# Patient Record
Sex: FEMALE | Race: OTHER | NOT HISPANIC OR LATINO | URBAN - METROPOLITAN AREA
[De-identification: names, ages, dates, MRNs, and addresses within clinical notes are randomized per-mention and may not be internally consistent; named-entity substitution may affect disease eponyms.]

---

## 2021-03-29 ENCOUNTER — OFFICE VISIT (OUTPATIENT)
Dept: URBAN - METROPOLITAN AREA CLINIC 109 | Facility: CLINIC | Age: 26
End: 2021-03-29

## 2021-11-18 ENCOUNTER — OFFICE VISIT (OUTPATIENT)
Dept: URBAN - METROPOLITAN AREA CLINIC 118 | Facility: CLINIC | Age: 26
End: 2021-11-18
Payer: MEDICAID

## 2021-11-18 DIAGNOSIS — K60.2 ANAL FISSURE: ICD-10-CM

## 2021-11-18 PROCEDURE — 99244 OFF/OP CNSLTJ NEW/EST MOD 40: CPT | Performed by: INTERNAL MEDICINE

## 2021-11-18 PROCEDURE — 99204 OFFICE O/P NEW MOD 45 MIN: CPT | Performed by: INTERNAL MEDICINE

## 2021-11-18 NOTE — HPI-TODAY'S VISIT:
The patient is referred by  for anal pain and mild bleeding.  Note was sent.  This pain was acute onset after delivery of her first child approximately 6 weeks ago.  There was a mild tearing of the vaginal cuff, but there was no significant episiotomy or rectal tearing.  She does have a history of mild hemorrhoids but her primary complaint is pain in the specific area of the anal canal with bowel movements, and a trace of blood.  She does have a history of mild constipation but this was worsened after delivery.  She has not been using any laxative therapy.  She has tried topical therapy with Tucks pads as well as Preparation H with no relief.  She has no history of an anal fissure.  There is been no recent anorectal trauma.

## 2021-11-18 NOTE — PHYSICAL EXAM GASTROINTESTINAL
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

## 2022-01-12 ENCOUNTER — OFFICE VISIT (OUTPATIENT)
Dept: URBAN - METROPOLITAN AREA CLINIC 118 | Facility: CLINIC | Age: 27
End: 2022-01-12
Payer: MEDICAID

## 2022-01-12 VITALS
WEIGHT: 160.8 LBS | HEART RATE: 56 BPM | BODY MASS INDEX: 26.79 KG/M2 | TEMPERATURE: 98.4 F | SYSTOLIC BLOOD PRESSURE: 112 MMHG | DIASTOLIC BLOOD PRESSURE: 72 MMHG | HEIGHT: 65 IN

## 2022-01-12 DIAGNOSIS — K60.2 ANAL FISSURE: ICD-10-CM

## 2022-01-12 DIAGNOSIS — K59.04 CHRONIC IDIOPATHIC CONSTIPATION: ICD-10-CM

## 2022-01-12 PROCEDURE — 99213 OFFICE O/P EST LOW 20 MIN: CPT | Performed by: INTERNAL MEDICINE

## 2022-01-12 NOTE — HPI-TODAY'S VISIT:
The patient is following up in clinic for her anal fissure.  She tried nitroglycerin cream and did not feel this helped much.  However she also has chronic constipation and with daily use of MiraLAX was able to have a bowel movement at least every 2 to 3 days.  This improved the symptoms of anal burning and there is no significant itching or rectal bleeding.  She also tried daily fiber but does not feel this helped as much as the MiraLAX did.  Currently she has no significant anal pain nor bleeding.

## 2022-01-14 PROBLEM — 82934008: Status: ACTIVE | Noted: 2022-01-14

## 2022-09-22 ENCOUNTER — OFFICE VISIT (OUTPATIENT)
Dept: URBAN - METROPOLITAN AREA CLINIC 52 | Facility: CLINIC | Age: 27
End: 2022-09-22
Payer: MEDICAID

## 2022-09-22 VITALS
BODY MASS INDEX: 24.12 KG/M2 | WEIGHT: 144.8 LBS | HEART RATE: 69 BPM | HEIGHT: 65 IN | DIASTOLIC BLOOD PRESSURE: 70 MMHG | OXYGEN SATURATION: 98 % | SYSTOLIC BLOOD PRESSURE: 114 MMHG | TEMPERATURE: 96.8 F

## 2022-09-22 DIAGNOSIS — K64.4 EXTERNAL HEMORRHOID: ICD-10-CM

## 2022-09-22 DIAGNOSIS — R19.5 HARD STOOL: ICD-10-CM

## 2022-09-22 DIAGNOSIS — K60.2 ANAL FISSURE: ICD-10-CM

## 2022-09-22 PROCEDURE — 99213 OFFICE O/P EST LOW 20 MIN: CPT | Performed by: PHYSICIAN ASSISTANT

## 2022-09-22 RX ORDER — HYDROCORTISONE 25 MG/G
1 APPLICATION CREAM TOPICAL
Qty: 1 | Refills: 1 | OUTPATIENT
Start: 2022-09-22 | End: 2022-10-06

## 2022-09-22 RX ORDER — NIFEDIPINE
PEA SIZE AMOUNT POWDER (GRAM) MISCELLANEOUS
Qty: 15 GRAM | Refills: 1 | OUTPATIENT
Start: 2022-09-22 | End: 2022-11-20

## 2022-09-22 NOTE — PHYSICAL EXAM GASTROINTESTINAL
Abdomen , soft, nontender, nondistended , no guarding or rigidity , no masses palpable , normal bowel sounds , Liver and Spleen,  no hepatosplenomegaly , liver nontender Rectal: external hemorrhoid, non-bleeding, tender to touch; anal fissure not easily appreciated

## 2022-09-22 NOTE — HPI-TODAY'S VISIT:
This is a 27 y.o. female with h/o anal fissure and chronic constipation. She presents to office c/o hard stools, hemorrhoids and blood in stool. Patient states her anal fissue was doing well for 6 months because her stools were soft. Recently, patient reports harder stools, even though she drinks 1/2 gallon water a day, eats a high fiber diet, and takes Miralax daily. She has failed topical Nitroglycerin in the past for anal fissure.

## 2022-10-20 ENCOUNTER — OFFICE VISIT (OUTPATIENT)
Dept: URBAN - METROPOLITAN AREA CLINIC 52 | Facility: CLINIC | Age: 27
End: 2022-10-20

## 2022-10-20 RX ORDER — NIFEDIPINE
PEA SIZE AMOUNT POWDER (GRAM) MISCELLANEOUS
Qty: 15 GRAM | Refills: 1 | Status: ACTIVE | COMMUNITY
Start: 2022-09-22 | End: 2022-11-20

## 2022-11-03 ENCOUNTER — WEB ENCOUNTER (OUTPATIENT)
Dept: URBAN - METROPOLITAN AREA CLINIC 52 | Facility: CLINIC | Age: 27
End: 2022-11-03

## 2022-11-07 ENCOUNTER — OFFICE VISIT (OUTPATIENT)
Dept: URBAN - METROPOLITAN AREA CLINIC 52 | Facility: CLINIC | Age: 27
End: 2022-11-07
Payer: MEDICAID

## 2022-11-07 VITALS
DIASTOLIC BLOOD PRESSURE: 62 MMHG | WEIGHT: 145 LBS | HEART RATE: 82 BPM | HEIGHT: 65 IN | BODY MASS INDEX: 24.16 KG/M2 | SYSTOLIC BLOOD PRESSURE: 90 MMHG | TEMPERATURE: 97.2 F

## 2022-11-07 DIAGNOSIS — K60.2 ANAL FISSURE: ICD-10-CM

## 2022-11-07 DIAGNOSIS — K59.00 CONSTIPATION, UNSPECIFIED CONSTIPATION TYPE: ICD-10-CM

## 2022-11-07 DIAGNOSIS — K64.9 HEMORRHOIDS, UNSPECIFIED HEMORRHOID TYPE: ICD-10-CM

## 2022-11-07 PROBLEM — 14760008: Status: ACTIVE | Noted: 2022-11-07

## 2022-11-07 PROCEDURE — 99214 OFFICE O/P EST MOD 30 MIN: CPT | Performed by: PHYSICIAN ASSISTANT

## 2022-11-07 RX ORDER — NIFEDIPINE
PEA SIZE AMOUNT POWDER (GRAM) MISCELLANEOUS
Qty: 15 GRAM | Refills: 1 | Status: ON HOLD | COMMUNITY
Start: 2022-09-22 | End: 2022-11-20

## 2022-11-07 NOTE — HPI-TODAY'S VISIT:
This is a 27 y.o. female with h/o anal fissure and chronic constipation. She presents to office c/o hard stools, hemorrhoids and blood in stool. Patient states her anal fissue was doing well for 6 months because her stools were soft. Recently, patient reports harder stools, even though she drinks 1/2 gallon water a day, eats a high fiber diet, and takes Miralax daily. She has failed topical Nitroglycerin in the past for anal fissure. She was started on Nifedipine oinment on her last office visit. She has been compliant with Nifedipine ointment, but says she still has hard stools and rectal bleeding. Unsure if rectal bleeding is coming from anal fissure or hemorrhoids.

## 2022-11-07 NOTE — PHYSICAL EXAM GASTROINTESTINAL
Abdomen , soft, nontender, nondistended , no guarding or rigidity , no masses palpable , normal bowel sounds , Liver and Spleen,  no hepatosplenomegaly , liver nontender Rectal: external hemorrhoidal skin tag, non-bleeding, nontender; anal fissure at 12 o' clock position, slightly tender, healing well

## 2022-12-01 ENCOUNTER — DASHBOARD ENCOUNTERS (OUTPATIENT)
Age: 27
End: 2022-12-01

## 2022-12-05 ENCOUNTER — OFFICE VISIT (OUTPATIENT)
Dept: URBAN - METROPOLITAN AREA CLINIC 52 | Facility: CLINIC | Age: 27
End: 2022-12-05

## 2024-12-30 ENCOUNTER — TELEPHONE ENCOUNTER (OUTPATIENT)
Dept: URBAN - METROPOLITAN AREA CLINIC 118 | Facility: CLINIC | Age: 29
End: 2024-12-30

## 2024-12-30 ENCOUNTER — OFFICE VISIT (OUTPATIENT)
Dept: URBAN - METROPOLITAN AREA CLINIC 118 | Facility: CLINIC | Age: 29
End: 2024-12-30
Payer: COMMERCIAL

## 2024-12-30 VITALS
HEART RATE: 73 BPM | TEMPERATURE: 97.5 F | SYSTOLIC BLOOD PRESSURE: 131 MMHG | DIASTOLIC BLOOD PRESSURE: 89 MMHG | WEIGHT: 161.8 LBS | HEIGHT: 65 IN | BODY MASS INDEX: 26.96 KG/M2

## 2024-12-30 DIAGNOSIS — K64.4 HEMORRHOIDS, EXTERNAL: ICD-10-CM

## 2024-12-30 DIAGNOSIS — K59.04 CHRONIC IDIOPATHIC CONSTIPATION: ICD-10-CM

## 2024-12-30 DIAGNOSIS — K60.2 ANAL FISSURE: ICD-10-CM

## 2024-12-30 DIAGNOSIS — K64.8 INTERNAL HEMORRHOIDS: ICD-10-CM

## 2024-12-30 PROBLEM — 23913003: Status: ACTIVE | Noted: 2024-12-30

## 2024-12-30 PROCEDURE — 99214 OFFICE O/P EST MOD 30 MIN: CPT

## 2024-12-30 NOTE — PHYSICAL EXAM GASTROINTESTINAL
Abdomen , soft, nontender, nondistended , no guarding or rigidity , no masses palpable , normal bowel sounds , Liver and Spleen , no hepatosplenomegaly present, liver nontender Rectal  Chaperone Miya present. External hemorrhoids, non-tender and non-thrombosed. Severe tenderness upon internal exam. Appears to be a fissure at the posterior midline.

## 2024-12-30 NOTE — PHYSICAL EXAM CONSTITUTIONAL:
well developed, well nourished , in no acute distress , ambulating without difficulty , normal communication ability
1 = Total assistance

## 2024-12-30 NOTE — HPI-TODAY'S VISIT:
Ms. Carter is a 28 y/o female with PMHx of anal fissure, hemorrhoids and CIC who presents today for evaluation.  Patient has seen our group in the past for management of anal fissure / internal hemorrhoids after the birth of her first child 3 years ago. They tried internal hemorrhoid banding, topical nitroglycerin cream and ointment, sitz baths, prep H, witch hazel etc all with minimal relief. Patient states symptoms finally became tolerable with Miralax and magnesium, but never completely went away.  After the birth of her second child two months ago, symptoms acutely worsened again. She reports severe rectal pain during and after a bowel movement. She notes frequent BRBPR. She is currently having a soft stool daily to QOD on Magnesium supplement.  She denies abdominal pain, NV or fever/chills. No prior colonoscopy.

## 2025-04-03 ENCOUNTER — OFFICE VISIT (OUTPATIENT)
Dept: URBAN - METROPOLITAN AREA CLINIC 118 | Facility: CLINIC | Age: 30
End: 2025-04-03

## 2025-04-21 ENCOUNTER — WEB ENCOUNTER (OUTPATIENT)
Dept: URBAN - METROPOLITAN AREA CLINIC 118 | Facility: CLINIC | Age: 30
End: 2025-04-21

## 2025-04-21 ENCOUNTER — OFFICE VISIT (OUTPATIENT)
Dept: URBAN - METROPOLITAN AREA CLINIC 118 | Facility: CLINIC | Age: 30
End: 2025-04-21
Payer: COMMERCIAL

## 2025-04-21 DIAGNOSIS — K64.4 HEMORRHOIDS, EXTERNAL: ICD-10-CM

## 2025-04-21 DIAGNOSIS — K58.1 IRRITABLE BOWEL SYNDROME WITH CONSTIPATION: ICD-10-CM

## 2025-04-21 DIAGNOSIS — K60.2 ANAL FISSURE: ICD-10-CM

## 2025-04-21 DIAGNOSIS — K64.8 INTERNAL HEMORRHOIDS: ICD-10-CM

## 2025-04-21 PROBLEM — 440630006: Status: ACTIVE | Noted: 2025-04-21

## 2025-04-21 PROCEDURE — 99213 OFFICE O/P EST LOW 20 MIN: CPT | Performed by: INTERNAL MEDICINE

## 2025-04-21 RX ORDER — LINACLOTIDE 72 UG/1
1 CAPSULE AT LEAST 30 MINUTES BEFORE THE FIRST MEAL OF THE DAY ON AN EMPTY STOMACH CAPSULE, GELATIN COATED ORAL ONCE A DAY
Qty: 30 | Refills: 2 | OUTPATIENT
Start: 2025-04-21 | End: 2025-07-20

## 2025-04-21 NOTE — HPI-TODAY'S VISIT:
Patient is a 30 y/o female who presents today for follow up on constipation.  Since last visit, she initially tried botox for her recurrent anal fissure on 2/3 but symptoms only improved for ~1 month. She then underwent a lateral internal sphincterotomy with Dr. Araujo 4/7. Doing well since the surgery, fissure has resolved and with minimal pain / bleeding at present. She is recommedned by Dr. Araujo to undergo a full colonoscopy 3 months from her LIS.  Still with constipation. She is currently having a BM QOD, ranges between bristol 2/3/4. She is taking Miralax QOD, bloom tea and other laxatives to help. She is concerned about associated symptoms of bloating and gas which are most bothersome to her.  Weight is down 7# intentionally.  ------------------------------------------------- Prior OV 12/30/24 Ms. Carter is a 30 y/o female with PMHx of anal fissure, hemorrhoids and CIC who presents today for evaluation.  Patient has seen our group in the past for management of anal fissure / internal hemorrhoids after the birth of her first child 3 years ago. They tried internal hemorrhoid banding, topical nitroglycerin cream and ointment, sitz baths, prep H, witch hazel etc all with minimal relief. Patient states symptoms finally became tolerable with Miralax and magnesium, but never completely went away.  After the birth of her second child two months ago, symptoms acutely worsened again. She reports severe rectal pain during and after a bowel movement. She notes frequent BRBPR. She is currently having a soft stool daily to QOD on Magnesium supplement.  She denies abdominal pain, NV or fever/chills. No prior colonoscopy.

## 2025-06-12 ENCOUNTER — LAB OUTSIDE AN ENCOUNTER (OUTPATIENT)
Dept: URBAN - METROPOLITAN AREA CLINIC 118 | Facility: CLINIC | Age: 30
End: 2025-06-12

## 2025-06-12 ENCOUNTER — OFFICE VISIT (OUTPATIENT)
Dept: URBAN - METROPOLITAN AREA CLINIC 118 | Facility: CLINIC | Age: 30
End: 2025-06-12
Payer: COMMERCIAL

## 2025-06-12 DIAGNOSIS — K58.1 IRRITABLE BOWEL SYNDROME WITH CONSTIPATION: ICD-10-CM

## 2025-06-12 DIAGNOSIS — K60.2 ANAL FISSURE: ICD-10-CM

## 2025-06-12 DIAGNOSIS — K64.8 INTERNAL HEMORRHOIDS: ICD-10-CM

## 2025-06-12 DIAGNOSIS — K64.4 HEMORRHOIDS, EXTERNAL: ICD-10-CM

## 2025-06-12 PROCEDURE — 99214 OFFICE O/P EST MOD 30 MIN: CPT | Performed by: INTERNAL MEDICINE

## 2025-06-12 RX ORDER — LINACLOTIDE 72 UG/1
1 CAPSULE AT LEAST 30 MINUTES BEFORE THE FIRST MEAL OF THE DAY ON AN EMPTY STOMACH CAPSULE, GELATIN COATED ORAL ONCE A DAY
Qty: 30 | Refills: 2 | Status: ACTIVE | COMMUNITY
Start: 2025-04-21 | End: 2025-07-20

## 2025-06-12 RX ORDER — LINACLOTIDE 72 UG/1
1 CAPSULE AT LEAST 30 MINUTES BEFORE THE FIRST MEAL OF THE DAY ON AN EMPTY STOMACH CAPSULE, GELATIN COATED ORAL ONCE A DAY
Qty: 90 | Refills: 1 | OUTPATIENT
Start: 2025-06-12 | End: 2025-12-09

## 2025-06-12 NOTE — HPI-TODAY'S VISIT:
Patient is a 30 y/o female who presents today for follow up appt.  Unable to get Linzess after last visit as it was $700 at [facility]. She is not currently taking anything, trying to eat better. Currently having a BM every other day but often hard stools. She denies abdominal or rectal pain. Her weight is down 4# intentionally since last visit.  Would like to schedule her colonoscopy as recommended by Dr. Araujo.  ----------------------------------------- Prior OV 4/21/25 Patient is a 30 y/o female who presents today for follow up on constipation.  Since last visit, she initially tried botox for her recurrent anal fissure on 2/3 but symptoms only improved for ~1 month. She then underwent a lateral internal sphincterotomy with Dr. Araujo 4/7. Doing well since the surgery, fissure has resolved and with minimal pain / bleeding at present. She is recommedned by Dr. Araujo to undergo a full colonoscopy 3 months from her LIS.  Still with constipation. She is currently having a BM QOD, ranges between bristol 2/3/4. She is taking Miralax QOD, bloom tea and other laxatives to help. She is concerned about associated symptoms of bloating and gas which are most bothersome to her.  Weight is down 7# intentionally.  ------------------------------------------------- Prior OV 12/30/24 Ms. Carter is a 30 y/o female with PMHx of anal fissure, hemorrhoids and CIC who presents today for evaluation.  Patient has seen our group in the past for management of anal fissure / internal hemorrhoids after the birth of her first child 3 years ago. They tried internal hemorrhoid banding, topical nitroglycerin cream and ointment, sitz baths, prep H, witch hazel etc all with minimal relief. Patient states symptoms finally became tolerable with Miralax and magnesium, but never completely went away.  After the birth of her second child two months ago, symptoms acutely worsened again. She reports severe rectal pain during and after a bowel movement. She notes frequent BRBPR. She is currently having a soft stool daily to QOD on Magnesium supplement.  She denies abdominal pain, NV or fever/chills. No prior colonoscopy.

## 2025-06-13 ENCOUNTER — TELEPHONE ENCOUNTER (OUTPATIENT)
Dept: URBAN - METROPOLITAN AREA CLINIC 118 | Facility: CLINIC | Age: 30
End: 2025-06-13

## 2025-06-17 ENCOUNTER — TELEPHONE ENCOUNTER (OUTPATIENT)
Dept: URBAN - METROPOLITAN AREA CLINIC 118 | Facility: CLINIC | Age: 30
End: 2025-06-17

## 2025-07-14 ENCOUNTER — CLAIMS CREATED FROM THE CLAIM WINDOW (OUTPATIENT)
Dept: URBAN - METROPOLITAN AREA SURGERY CENTER 23 | Facility: SURGERY CENTER | Age: 30
End: 2025-07-14
Payer: COMMERCIAL

## 2025-07-14 ENCOUNTER — CLAIMS CREATED FROM THE CLAIM WINDOW (OUTPATIENT)
Dept: URBAN - METROPOLITAN AREA CLINIC 4 | Facility: CLINIC | Age: 30
End: 2025-07-14
Payer: COMMERCIAL

## 2025-07-14 DIAGNOSIS — K63.89 OTHER SPECIFIED DISEASES OF INTESTINE: ICD-10-CM

## 2025-07-14 DIAGNOSIS — D12.5 ADENOMA OF SIGMOID COLON: ICD-10-CM

## 2025-07-14 DIAGNOSIS — R19.7 ACUTE DIARRHEA: ICD-10-CM

## 2025-07-14 DIAGNOSIS — R19.7 DIARRHEA: ICD-10-CM

## 2025-07-14 DIAGNOSIS — D12.5 BENIGN NEOPLASM OF SIGMOID COLON: ICD-10-CM

## 2025-07-14 DIAGNOSIS — K63.5 COLON POLYP: ICD-10-CM

## 2025-07-14 PROCEDURE — 88342 IMHCHEM/IMCYTCHM 1ST ANTB: CPT | Performed by: PATHOLOGY

## 2025-07-14 PROCEDURE — 88313 SPECIAL STAINS GROUP 2: CPT | Performed by: PATHOLOGY

## 2025-07-14 PROCEDURE — 88305 TISSUE EXAM BY PATHOLOGIST: CPT | Performed by: PATHOLOGY

## 2025-07-14 PROCEDURE — 45380 COLONOSCOPY AND BIOPSY: CPT | Performed by: INTERNAL MEDICINE

## 2025-07-14 PROCEDURE — 45385 COLONOSCOPY W/LESION REMOVAL: CPT | Performed by: INTERNAL MEDICINE

## 2025-07-14 PROCEDURE — 00811 ANES LWR INTST NDSC NOS: CPT | Performed by: NURSE ANESTHETIST, CERTIFIED REGISTERED

## 2025-07-14 RX ORDER — LINACLOTIDE 72 UG/1
1 CAPSULE AT LEAST 30 MINUTES BEFORE THE FIRST MEAL OF THE DAY ON AN EMPTY STOMACH CAPSULE, GELATIN COATED ORAL ONCE A DAY
Qty: 90 | Refills: 1 | Status: ACTIVE | COMMUNITY
Start: 2025-06-12 | End: 2025-12-09